# Patient Record
Sex: MALE
[De-identification: names, ages, dates, MRNs, and addresses within clinical notes are randomized per-mention and may not be internally consistent; named-entity substitution may affect disease eponyms.]

---

## 2018-05-04 PROBLEM — Z00.00 ENCOUNTER FOR PREVENTIVE HEALTH EXAMINATION: Status: ACTIVE | Noted: 2018-05-04

## 2018-05-18 ENCOUNTER — APPOINTMENT (OUTPATIENT)
Dept: ORTHOPEDIC SURGERY | Facility: CLINIC | Age: 58
End: 2018-05-18
Payer: MEDICARE

## 2018-05-18 VITALS
DIASTOLIC BLOOD PRESSURE: 101 MMHG | HEART RATE: 92 BPM | SYSTOLIC BLOOD PRESSURE: 169 MMHG | BODY MASS INDEX: 30.78 KG/M2 | HEIGHT: 70 IN | WEIGHT: 215 LBS

## 2018-05-18 DIAGNOSIS — Z78.9 OTHER SPECIFIED HEALTH STATUS: ICD-10-CM

## 2018-05-18 PROCEDURE — 99204 OFFICE O/P NEW MOD 45 MIN: CPT

## 2018-05-18 PROCEDURE — 73522 X-RAY EXAM HIPS BI 3-4 VIEWS: CPT

## 2018-05-18 RX ORDER — OLMESARTAN MEDOXOMIL AND HYDROCHLOROTHIAZIDE 40; 25 MG/1; MG/1
40-25 TABLET ORAL
Qty: 90 | Refills: 0 | Status: ACTIVE | COMMUNITY
Start: 2018-02-19

## 2018-05-18 RX ORDER — METOPROLOL SUCCINATE 25 MG/1
25 TABLET, EXTENDED RELEASE ORAL
Qty: 90 | Refills: 0 | Status: ACTIVE | COMMUNITY
Start: 2018-01-22

## 2018-05-18 RX ORDER — AMOXICILLIN 500 MG/1
500 CAPSULE ORAL
Qty: 28 | Refills: 0 | Status: ACTIVE | COMMUNITY
Start: 2018-03-19

## 2018-05-18 RX ORDER — ASPIRIN ENTERIC COATED TABLETS 81 MG 81 MG/1
81 TABLET, DELAYED RELEASE ORAL
Qty: 90 | Refills: 0 | Status: ACTIVE | COMMUNITY
Start: 2018-01-24

## 2018-05-18 RX ORDER — DICLOFENAC SODIUM 50 MG/1
50 TABLET, DELAYED RELEASE ORAL
Qty: 60 | Refills: 0 | Status: ACTIVE | COMMUNITY
Start: 2018-05-18 | End: 1900-01-01

## 2018-05-18 RX ORDER — METFORMIN HYDROCHLORIDE 1000 MG/1
1000 TABLET, COATED ORAL
Qty: 180 | Refills: 0 | Status: ACTIVE | COMMUNITY
Start: 2018-01-24

## 2019-09-20 ENCOUNTER — APPOINTMENT (OUTPATIENT)
Dept: ORTHOPEDIC SURGERY | Facility: CLINIC | Age: 59
End: 2019-09-20
Payer: MEDICARE

## 2019-09-20 VITALS — HEIGHT: 70 IN | WEIGHT: 248 LBS | BODY MASS INDEX: 35.5 KG/M2

## 2019-09-20 DIAGNOSIS — M16.0 BILATERAL PRIMARY OSTEOARTHRITIS OF HIP: ICD-10-CM

## 2019-09-20 DIAGNOSIS — Z98.890 OTHER SPECIFIED POSTPROCEDURAL STATES: ICD-10-CM

## 2019-09-20 PROCEDURE — 99214 OFFICE O/P EST MOD 30 MIN: CPT | Mod: 25

## 2019-09-20 PROCEDURE — 20611 DRAIN/INJ JOINT/BURSA W/US: CPT | Mod: RT

## 2019-09-20 PROCEDURE — 73521 X-RAY EXAM HIPS BI 2 VIEWS: CPT

## 2019-09-20 RX ORDER — MELOXICAM 15 MG/1
15 TABLET ORAL
Qty: 30 | Refills: 1 | Status: ACTIVE | COMMUNITY
Start: 2019-09-20 | End: 1900-01-01

## 2019-12-30 ENCOUNTER — FORM ENCOUNTER (OUTPATIENT)
Age: 59
End: 2019-12-30

## 2019-12-31 ENCOUNTER — APPOINTMENT (OUTPATIENT)
Dept: ORTHOPEDIC SURGERY | Facility: CLINIC | Age: 59
End: 2019-12-31
Payer: MEDICARE

## 2019-12-31 ENCOUNTER — OUTPATIENT (OUTPATIENT)
Dept: OUTPATIENT SERVICES | Facility: HOSPITAL | Age: 59
LOS: 1 days | End: 2019-12-31
Payer: MEDICARE

## 2019-12-31 ENCOUNTER — APPOINTMENT (OUTPATIENT)
Dept: RADIOLOGY | Facility: CLINIC | Age: 59
End: 2019-12-31
Payer: MEDICARE

## 2019-12-31 VITALS
HEART RATE: 102 BPM | BODY MASS INDEX: 35.84 KG/M2 | HEIGHT: 69 IN | SYSTOLIC BLOOD PRESSURE: 184 MMHG | WEIGHT: 242 LBS | DIASTOLIC BLOOD PRESSURE: 113 MMHG

## 2019-12-31 DIAGNOSIS — M16.11 UNILATERAL PRIMARY OSTEOARTHRITIS, RIGHT HIP: ICD-10-CM

## 2019-12-31 PROCEDURE — 27093 INJECTION FOR HIP X-RAY: CPT | Mod: RT

## 2019-12-31 PROCEDURE — 99214 OFFICE O/P EST MOD 30 MIN: CPT

## 2019-12-31 PROCEDURE — 73525 CONTRAST X-RAY OF HIP: CPT | Mod: 26,RT

## 2019-12-31 PROCEDURE — 73502 X-RAY EXAM HIP UNI 2-3 VIEWS: CPT | Mod: RT

## 2022-01-18 ENCOUNTER — TRANSCRIPTION ENCOUNTER (OUTPATIENT)
Age: 62
End: 2022-01-18

## 2022-01-19 ENCOUNTER — OUTPATIENT (OUTPATIENT)
Dept: OUTPATIENT SERVICES | Facility: HOSPITAL | Age: 62
LOS: 1 days | End: 2022-01-19

## 2022-01-19 ENCOUNTER — APPOINTMENT (OUTPATIENT)
Dept: DISASTER EMERGENCY | Facility: HOSPITAL | Age: 62
End: 2022-01-19

## 2022-01-19 VITALS
HEART RATE: 88 BPM | OXYGEN SATURATION: 94 % | DIASTOLIC BLOOD PRESSURE: 83 MMHG | SYSTOLIC BLOOD PRESSURE: 159 MMHG | RESPIRATION RATE: 17 BRPM | TEMPERATURE: 100 F

## 2022-01-19 VITALS
SYSTOLIC BLOOD PRESSURE: 164 MMHG | DIASTOLIC BLOOD PRESSURE: 89 MMHG | HEART RATE: 86 BPM | OXYGEN SATURATION: 95 % | WEIGHT: 233.03 LBS | HEIGHT: 70 IN | TEMPERATURE: 99 F | RESPIRATION RATE: 17 BRPM

## 2022-01-19 DIAGNOSIS — U07.1 COVID-19: ICD-10-CM

## 2022-01-19 RX ORDER — SODIUM CHLORIDE 9 MG/ML
250 INJECTION INTRAMUSCULAR; INTRAVENOUS; SUBCUTANEOUS
Refills: 0 | Status: DISCONTINUED | OUTPATIENT
Start: 2022-01-19 | End: 2022-02-02

## 2022-01-19 RX ORDER — SOTROVIMAB 62.5 MG/ML
500 INJECTION, SOLUTION, CONCENTRATE INTRAVENOUS ONCE
Refills: 0 | Status: COMPLETED | OUTPATIENT
Start: 2022-01-19 | End: 2022-01-19

## 2022-01-19 RX ADMIN — SOTROVIMAB 116 MILLIGRAM(S): 62.5 INJECTION, SOLUTION, CONCENTRATE INTRAVENOUS at 08:54

## 2022-01-19 NOTE — MONOCLONAL ANTIBODY INFUSION - EXAM
CC: Monoclonal Antibody Infusion/COVID 19 Positive  59yMale    exam/findings:  T(C): 37 (01-19-22 @ 08:28), Max: 37 (01-19-22 @ 08:28)  HR: 86 (01-19-22 @ 08:28) (86 - 86)  BP: 164/89 (01-19-22 @ 08:28) (164/89 - 164/89)  RR: 17 (01-19-22 @ 08:28) (17 - 17)  SpO2: 95% (01-19-22 @ 08:28) (95% - 95%)      PE:   Appearance: NAD, obese	  HEENT:   Normal oral mucosa,   Lymphatic: No lymphadenopathy  Cardiovascular: Normal S1 S2, No JVD, No murmurs, No edema  Respiratory: Lungs clear to auscultation	  Gastrointestinal:  Soft, Non-tender, + BS	  Skin: warm and dry  Neurologic: Non-focal  Extremities: Normal range of motion,

## 2022-01-19 NOTE — MONOCLONAL ANTIBODY INFUSION - ASSESSMENT AND PLAN
ASSESSMENT:  Pt is a 59M Covid (+) referred by Dr. ASYA Proctor ([facility]) who presents to infusion center for Monoclonal antibody infusion (Sotrovimab)  Symptoms/ Criteria: Fever, SOB on exertion, muscle pains  Risk Profile includes: Obesity, unvaccinated    PLAN:  - infusion procedure explained to patient   -Consent for monoclonal antibody infusion obtained   - Risk & benefits discussed/all questions answered  -infuse  Sotrovimab IV over 30 minutes  -observe patient for one hour post infusion and discharge home

## 2022-02-10 ENCOUNTER — NON-APPOINTMENT (OUTPATIENT)
Age: 62
End: 2022-02-10

## 2022-02-11 ENCOUNTER — NON-APPOINTMENT (OUTPATIENT)
Age: 62
End: 2022-02-11

## 2022-02-11 ENCOUNTER — APPOINTMENT (OUTPATIENT)
Dept: PULMONOLOGY | Facility: CLINIC | Age: 62
End: 2022-02-11
Payer: MEDICARE

## 2022-02-11 VITALS
SYSTOLIC BLOOD PRESSURE: 145 MMHG | HEART RATE: 102 BPM | TEMPERATURE: 98.1 F | DIASTOLIC BLOOD PRESSURE: 92 MMHG | HEIGHT: 69 IN | OXYGEN SATURATION: 96 % | WEIGHT: 234 LBS | BODY MASS INDEX: 34.66 KG/M2

## 2022-02-11 LAB — DEPRECATED D DIMER PPP IA-ACNC: 493 NG/ML DDU

## 2022-02-11 PROCEDURE — 71045 X-RAY EXAM CHEST 1 VIEW: CPT

## 2022-02-11 PROCEDURE — 99204 OFFICE O/P NEW MOD 45 MIN: CPT | Mod: CS,25

## 2022-02-14 LAB
ALBUMIN SERPL ELPH-MCNC: 4.2 G/DL
ALP BLD-CCNC: 92 U/L
ALT SERPL-CCNC: 17 U/L
ANION GAP SERPL CALC-SCNC: 14 MMOL/L
AST SERPL-CCNC: 20 U/L
BILIRUB SERPL-MCNC: 0.8 MG/DL
BUN SERPL-MCNC: 17 MG/DL
CALCIUM SERPL-MCNC: 9.7 MG/DL
CHLORIDE SERPL-SCNC: 96 MMOL/L
CO2 SERPL-SCNC: 24 MMOL/L
CREAT SERPL-MCNC: 0.95 MG/DL
CRP SERPL-MCNC: 13 MG/L
FERRITIN SERPL-MCNC: 746 NG/ML
GLUCOSE SERPL-MCNC: 421 MG/DL
POTASSIUM SERPL-SCNC: 4.3 MMOL/L
PROCALCITONIN SERPL-MCNC: 0.32 NG/ML
PROT SERPL-MCNC: 7.5 G/DL
SODIUM SERPL-SCNC: 134 MMOL/L

## 2022-02-23 ENCOUNTER — RESULT CHARGE (OUTPATIENT)
Age: 62
End: 2022-02-23

## 2022-03-11 ENCOUNTER — APPOINTMENT (OUTPATIENT)
Dept: PULMONOLOGY | Facility: CLINIC | Age: 62
End: 2022-03-11
Payer: MEDICARE

## 2022-03-11 VITALS
DIASTOLIC BLOOD PRESSURE: 74 MMHG | BODY MASS INDEX: 33.36 KG/M2 | OXYGEN SATURATION: 99 % | HEART RATE: 92 BPM | WEIGHT: 233 LBS | RESPIRATION RATE: 16 BRPM | SYSTOLIC BLOOD PRESSURE: 151 MMHG | TEMPERATURE: 98 F | HEIGHT: 70 IN

## 2022-03-11 DIAGNOSIS — U07.1 COVID-19: ICD-10-CM

## 2022-03-11 PROCEDURE — 94010 BREATHING CAPACITY TEST: CPT

## 2022-03-11 PROCEDURE — 94729 DIFFUSING CAPACITY: CPT

## 2022-03-11 PROCEDURE — ZZZZZ: CPT

## 2022-03-11 PROCEDURE — 99213 OFFICE O/P EST LOW 20 MIN: CPT | Mod: CS,25

## 2022-03-11 PROCEDURE — 94727 GAS DIL/WSHOT DETER LNG VOL: CPT

## 2023-01-12 ENCOUNTER — NON-APPOINTMENT (OUTPATIENT)
Age: 63
End: 2023-01-12

## 2023-01-13 ENCOUNTER — EMERGENCY (EMERGENCY)
Facility: HOSPITAL | Age: 63
LOS: 1 days | Discharge: HOME | End: 2023-01-13
Attending: EMERGENCY MEDICINE | Admitting: EMERGENCY MEDICINE
Payer: MEDICARE

## 2023-01-13 VITALS
HEART RATE: 99 BPM | DIASTOLIC BLOOD PRESSURE: 87 MMHG | SYSTOLIC BLOOD PRESSURE: 142 MMHG | OXYGEN SATURATION: 100 % | RESPIRATION RATE: 16 BRPM | TEMPERATURE: 98 F

## 2023-01-13 VITALS
DIASTOLIC BLOOD PRESSURE: 89 MMHG | SYSTOLIC BLOOD PRESSURE: 122 MMHG | HEART RATE: 134 BPM | OXYGEN SATURATION: 100 % | RESPIRATION RATE: 16 BRPM | TEMPERATURE: 98 F

## 2023-01-13 LAB
ALBUMIN SERPL ELPH-MCNC: 4.5 G/DL — SIGNIFICANT CHANGE UP (ref 3.3–5)
ALP SERPL-CCNC: 74 U/L — SIGNIFICANT CHANGE UP (ref 40–120)
ALT FLD-CCNC: 34 U/L — SIGNIFICANT CHANGE UP (ref 4–41)
ANION GAP SERPL CALC-SCNC: 14 MMOL/L — SIGNIFICANT CHANGE UP (ref 7–14)
AST SERPL-CCNC: 45 U/L — HIGH (ref 4–40)
BASOPHILS # BLD AUTO: 0.03 K/UL — SIGNIFICANT CHANGE UP (ref 0–0.2)
BASOPHILS NFR BLD AUTO: 0.3 % — SIGNIFICANT CHANGE UP (ref 0–2)
BILIRUB SERPL-MCNC: 0.6 MG/DL — SIGNIFICANT CHANGE UP (ref 0.2–1.2)
BUN SERPL-MCNC: 19 MG/DL — SIGNIFICANT CHANGE UP (ref 7–23)
CALCIUM SERPL-MCNC: 9.6 MG/DL — SIGNIFICANT CHANGE UP (ref 8.4–10.5)
CHLORIDE SERPL-SCNC: 98 MMOL/L — SIGNIFICANT CHANGE UP (ref 98–107)
CO2 SERPL-SCNC: 21 MMOL/L — LOW (ref 22–31)
CREAT SERPL-MCNC: 1.19 MG/DL — SIGNIFICANT CHANGE UP (ref 0.5–1.3)
EGFR: 69 ML/MIN/1.73M2 — SIGNIFICANT CHANGE UP
EOSINOPHIL # BLD AUTO: 0 K/UL — SIGNIFICANT CHANGE UP (ref 0–0.5)
EOSINOPHIL NFR BLD AUTO: 0 % — SIGNIFICANT CHANGE UP (ref 0–6)
GLUCOSE SERPL-MCNC: 383 MG/DL — HIGH (ref 70–99)
HCT VFR BLD CALC: 44.2 % — SIGNIFICANT CHANGE UP (ref 39–50)
HGB BLD-MCNC: 14.8 G/DL — SIGNIFICANT CHANGE UP (ref 13–17)
IANC: 9.99 K/UL — HIGH (ref 1.8–7.4)
IMM GRANULOCYTES NFR BLD AUTO: 0.8 % — SIGNIFICANT CHANGE UP (ref 0–0.9)
LYMPHOCYTES # BLD AUTO: 0.76 K/UL — LOW (ref 1–3.3)
LYMPHOCYTES # BLD AUTO: 6.5 % — LOW (ref 13–44)
MAGNESIUM SERPL-MCNC: 1.5 MG/DL — LOW (ref 1.6–2.6)
MCHC RBC-ENTMCNC: 32.5 PG — SIGNIFICANT CHANGE UP (ref 27–34)
MCHC RBC-ENTMCNC: 33.5 GM/DL — SIGNIFICANT CHANGE UP (ref 32–36)
MCV RBC AUTO: 96.9 FL — SIGNIFICANT CHANGE UP (ref 80–100)
MONOCYTES # BLD AUTO: 0.79 K/UL — SIGNIFICANT CHANGE UP (ref 0–0.9)
MONOCYTES NFR BLD AUTO: 6.8 % — SIGNIFICANT CHANGE UP (ref 2–14)
NEUTROPHILS # BLD AUTO: 9.99 K/UL — HIGH (ref 1.8–7.4)
NEUTROPHILS NFR BLD AUTO: 85.6 % — HIGH (ref 43–77)
NRBC # BLD: 0 /100 WBCS — SIGNIFICANT CHANGE UP (ref 0–0)
NRBC # FLD: 0 K/UL — SIGNIFICANT CHANGE UP (ref 0–0)
PHOSPHATE SERPL-MCNC: 2.7 MG/DL — SIGNIFICANT CHANGE UP (ref 2.5–4.5)
PLATELET # BLD AUTO: 259 K/UL — SIGNIFICANT CHANGE UP (ref 150–400)
POTASSIUM SERPL-MCNC: 3.9 MMOL/L — SIGNIFICANT CHANGE UP (ref 3.5–5.3)
POTASSIUM SERPL-SCNC: 3.9 MMOL/L — SIGNIFICANT CHANGE UP (ref 3.5–5.3)
PROT SERPL-MCNC: 8 G/DL — SIGNIFICANT CHANGE UP (ref 6–8.3)
RBC # BLD: 4.56 M/UL — SIGNIFICANT CHANGE UP (ref 4.2–5.8)
RBC # FLD: 11.8 % — SIGNIFICANT CHANGE UP (ref 10.3–14.5)
SODIUM SERPL-SCNC: 133 MMOL/L — LOW (ref 135–145)
WBC # BLD: 11.66 K/UL — HIGH (ref 3.8–10.5)
WBC # FLD AUTO: 11.66 K/UL — HIGH (ref 3.8–10.5)

## 2023-01-13 PROCEDURE — 99285 EMERGENCY DEPT VISIT HI MDM: CPT | Mod: 25,GC

## 2023-01-13 PROCEDURE — 12004 RPR S/N/AX/GEN/TRK7.6-12.5CM: CPT

## 2023-01-13 RX ORDER — TETANUS TOXOID, REDUCED DIPHTHERIA TOXOID AND ACELLULAR PERTUSSIS VACCINE, ADSORBED 5; 2.5; 8; 8; 2.5 [IU]/.5ML; [IU]/.5ML; UG/.5ML; UG/.5ML; UG/.5ML
0.5 SUSPENSION INTRAMUSCULAR ONCE
Refills: 0 | Status: COMPLETED | OUTPATIENT
Start: 2023-01-13 | End: 2023-01-13

## 2023-01-13 RX ORDER — LIDOCAINE HYDROCHLORIDE AND EPINEPHRINE 10; 10 MG/ML; UG/ML
20 INJECTION, SOLUTION INFILTRATION; PERINEURAL ONCE
Refills: 0 | Status: DISCONTINUED | OUTPATIENT
Start: 2023-01-13 | End: 2023-01-17

## 2023-01-13 RX ADMIN — TETANUS TOXOID, REDUCED DIPHTHERIA TOXOID AND ACELLULAR PERTUSSIS VACCINE, ADSORBED 0.5 MILLILITER(S): 5; 2.5; 8; 8; 2.5 SUSPENSION INTRAMUSCULAR at 17:14
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
## 2023-01-13 NOTE — ED PROVIDER NOTE - TEST CONSIDERED BUT NOT PERFORMED
Tests Considered But Not Performed CT head not performed due to pt not recently on AC and the neuro exam is nonfocal
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
## 2023-01-13 NOTE — ED PROCEDURE NOTE - ATTENDING CONTRIBUTION TO CARE
Dr. Otoole: I personally supervised this procedure performed by the resident and I agree with the above documentation.
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
## 2023-01-13 NOTE — ED PROVIDER NOTE - PATIENT PORTAL LINK FT
You can access the FollowMyHealth Patient Portal offered by [facility] by registering at the following website: [email or web address]. By joining [employer]’s FollowMyHealth portal, you will also be able to view your health information using other applications (apps) compatible with our system.
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
## 2023-01-13 NOTE — ED PROCEDURE NOTE - NS ED PROCEDURE ASSISTED BY
[de-identified] : 17M who presents with 2 months of epistaxis. He reports most of them were self limited, but at one point he eventually had to go to the ER. He denies any nasal congestion, purulent nasal drainage, facial pain/pressure, anosmia. \par \par He denies any other ENT complaints.  Supervision was available

## 2023-01-13 NOTE — ED PROVIDER NOTE - CONSIDERATION OF ADMISSION OBSERVATION
resolution of pt's SVT in ED, no CP/SOB, no indication for inpatient admission; head trauma but not on AC recently and no abnormal neuro exam to suggest ICH necessitating CT head Consideration of Admission/Observation
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
## 2023-01-13 NOTE — ED PROVIDER NOTE - CARE PLAN
1 Principal Discharge DX:	Acute head trauma   Principal Discharge DX:	Acute head trauma  Secondary Diagnosis:	SVT (supraventricular tachycardia)
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
## 2023-01-13 NOTE — ED PROVIDER NOTE - PHYSICAL EXAMINATION
Head: No steps offs, bruising or hematoma noted throughout the skull. No otorrhea, rhinorrhea. No raccoon's sign or owens's sign present. Two lacerations present; one at 5.5cm and another at 3.5cm on posterior and left parietal areas.  Neck: Pt able to rotate neck 45 degrees to the left and right w/o difficulty or pain. No pain on palpation of the mid cervical spine. No step offs present.  Chest: No pain on palpation of the ribs. No pain on deep inspiration. No obvious deformities or bruising  Extremities: No obvious fractures or deformities. Sensation intact throughout. Full RoM.

## 2023-06-01 ENCOUNTER — APPOINTMENT (OUTPATIENT)
Dept: OTOLARYNGOLOGY | Facility: CLINIC | Age: 63
End: 2023-06-01

## 2023-11-13 ENCOUNTER — APPOINTMENT (OUTPATIENT)
Dept: ORTHOPEDIC SURGERY | Facility: CLINIC | Age: 63
End: 2023-11-13